# Patient Record
Sex: MALE | Race: WHITE | Employment: OTHER | URBAN - METROPOLITAN AREA
[De-identification: names, ages, dates, MRNs, and addresses within clinical notes are randomized per-mention and may not be internally consistent; named-entity substitution may affect disease eponyms.]

---

## 2021-03-25 VITALS
HEART RATE: 70 BPM | DIASTOLIC BLOOD PRESSURE: 85 MMHG | RESPIRATION RATE: 18 BRPM | TEMPERATURE: 99.1 F | OXYGEN SATURATION: 98 % | WEIGHT: 133 LBS | SYSTOLIC BLOOD PRESSURE: 111 MMHG

## 2021-03-25 RX ORDER — TRAMADOL HYDROCHLORIDE 50 MG/1
50 TABLET ORAL EVERY 4 HOURS PRN
COMMUNITY
End: 2021-03-26

## 2021-03-25 RX ORDER — CLOBETASOL PROPIONATE 0.5 MG/G
CREAM TOPICAL 2 TIMES DAILY PRN
COMMUNITY

## 2021-03-25 RX ORDER — METOPROLOL TARTRATE 50 MG
50 TABLET ORAL 2 TIMES DAILY
COMMUNITY

## 2021-03-25 RX ORDER — TRIAMCINOLONE ACETONIDE 1 MG/G
CREAM TOPICAL 2 TIMES DAILY
COMMUNITY

## 2021-03-25 RX ORDER — DILTIAZEM HYDROCHLORIDE 240 MG/1
240 CAPSULE, EXTENDED RELEASE ORAL DAILY
COMMUNITY

## 2021-03-25 RX ORDER — LANOLIN ALCOHOL/MO/W.PET/CERES
6 CREAM (GRAM) TOPICAL AT BEDTIME
COMMUNITY

## 2021-03-25 RX ORDER — BETAMETHASONE DIPROPIONATE 0.5 MG/G
CREAM TOPICAL 2 TIMES DAILY PRN
COMMUNITY

## 2021-03-25 RX ORDER — LOPERAMIDE HCL 2 MG
2 CAPSULE ORAL DAILY PRN
COMMUNITY

## 2021-03-25 RX ORDER — DONEPEZIL HYDROCHLORIDE 5 MG/1
5 TABLET, FILM COATED ORAL AT BEDTIME
COMMUNITY

## 2021-03-25 RX ORDER — ACETAMINOPHEN 500 MG
1000 TABLET ORAL EVERY 6 HOURS PRN
COMMUNITY

## 2021-03-25 RX ORDER — SERTRALINE HYDROCHLORIDE 25 MG/1
25 TABLET, FILM COATED ORAL DAILY
COMMUNITY

## 2021-03-25 RX ORDER — TAMSULOSIN HYDROCHLORIDE 0.4 MG/1
0.4 CAPSULE ORAL DAILY
COMMUNITY

## 2021-03-26 ENCOUNTER — NURSING HOME VISIT (OUTPATIENT)
Dept: GERIATRICS | Facility: CLINIC | Age: 86
End: 2021-03-26
Payer: COMMERCIAL

## 2021-03-26 ENCOUNTER — HOSPITAL LABORATORY (OUTPATIENT)
Dept: NURSING HOME | Facility: OTHER | Age: 86
End: 2021-03-26

## 2021-03-26 DIAGNOSIS — F02.818 LATE ONSET ALZHEIMER'S DISEASE WITH BEHAVIORAL DISTURBANCE (H): ICD-10-CM

## 2021-03-26 DIAGNOSIS — F41.9 ANXIETY: ICD-10-CM

## 2021-03-26 DIAGNOSIS — N40.1 BENIGN PROSTATIC HYPERPLASIA WITH LOWER URINARY TRACT SYMPTOMS, SYMPTOM DETAILS UNSPECIFIED: ICD-10-CM

## 2021-03-26 DIAGNOSIS — I48.11 LONGSTANDING PERSISTENT ATRIAL FIBRILLATION (H): ICD-10-CM

## 2021-03-26 DIAGNOSIS — D62 ANEMIA DUE TO BLOOD LOSS, ACUTE: ICD-10-CM

## 2021-03-26 DIAGNOSIS — Z47.89 AFTERCARE FOLLOWING SURGERY OF THE MUSCULOSKELETAL SYSTEM: Primary | ICD-10-CM

## 2021-03-26 DIAGNOSIS — G30.1 LATE ONSET ALZHEIMER'S DISEASE WITH BEHAVIORAL DISTURBANCE (H): ICD-10-CM

## 2021-03-26 PROCEDURE — 99309 SBSQ NF CARE MODERATE MDM 30: CPT | Performed by: NURSE PRACTITIONER

## 2021-03-26 RX ORDER — TRAMADOL HYDROCHLORIDE 50 MG/1
TABLET ORAL
Qty: 200 TABLET | Refills: 0 | Status: SHIPPED | OUTPATIENT
Start: 2021-03-26

## 2021-03-26 NOTE — PROGRESS NOTES
[place] [facility]  PRIMARY CARE PROVIDER AND CLINIC:  No primary care provider on file., No primary physician on file.  Chief Complaint   Patient presents with     Hospital F/U     [place] Medical Record Number:  [id number]  Place of Service where encounter took place:  [facility] ([facility]) [[id number]]    Ludwin Glasgow  is a 89 year old  (2/14/1927), admitted to the above facility from  [facility]. Hospital stay 3/18/21 through 3/24/21..  Admitted to this facility for  rehab, medical management and nursing care.    HPI:    HPI information obtained from: facility chart records, facility staff, patient report and Care Everywhere Epic chart review.   Brief Summary of Hospital Course: lost his balance and had a fall resulting in a hip fracutre. Underwent ORIF on 3/19. Had problems with A fib with RVR.   MEDICATION CHANGES: started lovenox for 25 days. Started tramadol.  Diltiazem increased from 120 mg daily to 240 mg daily  RECOMMENDED FOLLOW UP: Orthopedic follow-up with Dr. Hill in 2 weeks  Updates on Status Since Skilled nursing Admission: 3/26 nursing reports that they would like the tramadol scheduled.    CODE STATUS/ADVANCE DIRECTIVES DISCUSSION:   DNR / DNI  Patient's living condition: lives in an assisted living facility - [facility]    ALLERGIES: Patient has no known allergies.  PAST MEDICAL HISTORY:  has no past medical history on file.  PAST SURGICAL HISTORY:   has no past surgical history on file.  FAMILY HISTORY: family history is not on file.  SOCIAL HISTORY:       Post Discharge Medication Reconciliation Status: discharge medications reconciled, continue medications without change  Current Outpatient Medications   Medication Sig Dispense Refill     acetaminophen (TYLENOL) 500 MG tablet Take 1,000 mg by mouth every 6 hours as needed for mild pain       alum & mag hydroxide-simethicone (MAALOX) 200-200-25 MG CHEW chewable tablet Take  1 tablet by mouth daily as needed for indigestion       betamethasone dipropionate (DIPROSONE) 0.05 % external cream Apply topically 2 times daily as needed       cholecalciferol (VITAMIN D3) 25 mcg (1000 units) capsule Take 1 capsule by mouth daily       clobetasol (TEMOVATE) 0.05 % external cream Apply topically 2 times daily as needed       diltiazem ER (DILT-XR) 240 MG 24 hr ER beaded capsule Take 240 mg by mouth daily       donepezil (ARICEPT) 5 MG tablet Take 5 mg by mouth At Bedtime       enoxaparin ANTICOAGULANT (LOVENOX) 40 MG/0.4ML syringe Inject 40 mg Subcutaneous daily       loperamide (IMODIUM) 2 MG capsule Take 2 mg by mouth daily as needed for diarrhea       magnesium hydroxide (MILK OF MAGNESIA) 400 MG/5ML suspension Take 5 mLs by mouth daily as needed for constipation or heartburn       melatonin 3 MG tablet Take 6 mg by mouth At Bedtime       metoprolol tartrate (LOPRESSOR) 50 MG tablet Take 50 mg by mouth 2 times daily       Mouthwashes (BIOTENE DRY MOUTH MT) Take by mouth daily as needed Non-Pharmacological Interventions:  1=Offer fluids if not NPO 2=Provide Toothette swab 3=Ice chips if not NPO  as needed for comfort cares       omeprazole (PRILOSEC) 20 MG DR capsule Take 20 mg by mouth daily       sertraline (ZOLOFT) 25 MG tablet Take 25 mg by mouth daily       tamsulosin (FLOMAX) 0.4 MG capsule Take 0.4 mg by mouth daily       Tolnaftate (ABSORBINE JR EX) AS NEEDED FOR PAIN  Non-Pharmacological Interventions:  1=Warm blanket 2=Re-position 3=Ice pack 4=Warm pack       traMADol (ULTRAM) 50 MG tablet Take 1 tablet (50 mg) by mouth 2 times daily. May also take 1 tablet (50 mg) 2 times daily as needed for pain. 200 tablet 0     triamcinolone (KENALOG) 0.1 % external cream Apply topically 2 times daily         REVIEW OF SYSTEMS:   Limited secondary to cognitive impairment but today pt reports pain with therapy.     PHYSICAL EXAM:  /85   Pulse 70   Temp 99.1  F (37.3  C)   Resp 18   Wt 60.3  kg (133 lb)   SpO2 98%   GENERAL APPEARANCE:  Alert, in no distress  RESP: Nonlabored respirations  CV:  edema no lower leg edema  M/S:  Gait and station observed in wheelchair working with therapy,   SKIN:  Inspection of skin and subcutaneous tissue at baseline.  Few areas of bruising on hands.  Incision not observed  PSYCH:  insight and judgement, memory impaired, affect and mood normal    ASSESSMENT/PLAN:  Aftercare following surgery of the musculoskeletal system  S/P ORIF of left hip fracture after a fall.  Admitted to the nursing home for therapy.  Will need to follow-up with Ortho in 2 weeks.  On Lovenox for DVT prevention. Physical Therapy reports grimaces in pain does not know to ask for pain medications.   -Weightbearing as tolerated per Ortho  - traMADol (ULTRAM) 50 MG tablet; Take 1 tablet (50 mg) by mouth 2 times daily. May also take 1 tablet (50 mg) 2 times daily as needed for pain.  - Acetaminophen 1,000 mg by mouth three times daily and 1,000 mg by mouth once daily as needed.   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  Patient had delirium while at the hospital.  Resides in a locked memory care at baseline.  Continues on Aricept and sertraline    Benign prostatic hyperplasia with lower urinary tract symptoms, symptom details unspecified  Stable. Continue flomax.    Anemia due to blood loss, acute  Recheck PRN if symptomatic. Post op hgb 10.2.     Orders:  Acetaminophen 1,000 mg by mouth three times daily and 1,000 mg by mouth once daily as needed.    Tramadol 50 mg p.o. twice a day at 6 AM and 12 PM and tramadol 50 mg p.o. twice a day as needed for pain.    Electronically signed by:  Cherry DYE CNP

## 2021-03-26 NOTE — LETTER
3/26/2021        RE: Ludwin Glasgow  [address] [place] [address]        [place] [facility]  PRIMARY CARE PROVIDER AND CLINIC:  No primary care provider on file., No primary physician on file.  Chief Complaint   Patient presents with     Hospital F/U     [place] Medical Record Number:  [id number]  Place of Service where encounter took place:  [facility] [[id number]]    Ludwin Glasgow  is a 89 year old  (2/14/1927), admitted to the above facility from  [facility]. Hospital stay 3/18/21 through 3/24/21..  Admitted to this facility for  rehab, medical management and nursing care.    HPI:    HPI information obtained from: facility chart records, facility staff, patient report and Care Everywhere Epic chart review.   Brief Summary of Hospital Course: lost his balance and had a fall resulting in a hip fracutre. Underwent ORIF on 3/19. Had problems with A fib with RVR.   MEDICATION CHANGES: started lovenox for 25 days. Started tramadol.  Diltiazem increased from 120 mg daily to 240 mg daily  RECOMMENDED FOLLOW UP: Orthopedic follow-up with Dr. Hill in 2 weeks  Updates on Status Since Skilled nursing Admission: 3/26 nursing reports that they would like the tramadol scheduled.    CODE STATUS/ADVANCE DIRECTIVES DISCUSSION:   DNR / DNI  Patient's living condition: lives in an assisted living facility [facility]    ALLERGIES: Patient has no known allergies.  PAST MEDICAL HISTORY:  has no past medical history on file.  PAST SURGICAL HISTORY:   has no past surgical history on file.  FAMILY HISTORY: family history is not on file.  SOCIAL HISTORY:       Post Discharge Medication Reconciliation Status: discharge medications reconciled, continue medications without change  Current Outpatient Medications   Medication Sig Dispense Refill     acetaminophen (TYLENOL) 500 MG tablet Take 1,000 mg by mouth every 6 hours as needed for  mild pain       alum & mag hydroxide-simethicone (MAALOX) 200-200-25 MG CHEW chewable tablet Take 1 tablet by mouth daily as needed for indigestion       betamethasone dipropionate (DIPROSONE) 0.05 % external cream Apply topically 2 times daily as needed       cholecalciferol (VITAMIN D3) 25 mcg (1000 units) capsule Take 1 capsule by mouth daily       clobetasol (TEMOVATE) 0.05 % external cream Apply topically 2 times daily as needed       diltiazem ER (DILT-XR) 240 MG 24 hr ER beaded capsule Take 240 mg by mouth daily       donepezil (ARICEPT) 5 MG tablet Take 5 mg by mouth At Bedtime       enoxaparin ANTICOAGULANT (LOVENOX) 40 MG/0.4ML syringe Inject 40 mg Subcutaneous daily       loperamide (IMODIUM) 2 MG capsule Take 2 mg by mouth daily as needed for diarrhea       magnesium hydroxide (MILK OF MAGNESIA) 400 MG/5ML suspension Take 5 mLs by mouth daily as needed for constipation or heartburn       melatonin 3 MG tablet Take 6 mg by mouth At Bedtime       metoprolol tartrate (LOPRESSOR) 50 MG tablet Take 50 mg by mouth 2 times daily       Mouthwashes (BIOTENE DRY MOUTH MT) Take by mouth daily as needed Non-Pharmacological Interventions:  1=Offer fluids if not NPO 2=Provide Toothette swab 3=Ice chips if not NPO  as needed for comfort cares       omeprazole (PRILOSEC) 20 MG DR capsule Take 20 mg by mouth daily       sertraline (ZOLOFT) 25 MG tablet Take 25 mg by mouth daily       tamsulosin (FLOMAX) 0.4 MG capsule Take 0.4 mg by mouth daily       Tolnaftate (ABSORBINE JR EX) AS NEEDED FOR PAIN  Non-Pharmacological Interventions:  1=Warm blanket 2=Re-position 3=Ice pack 4=Warm pack       traMADol (ULTRAM) 50 MG tablet Take 1 tablet (50 mg) by mouth 2 times daily. May also take 1 tablet (50 mg) 2 times daily as needed for pain. 200 tablet 0     triamcinolone (KENALOG) 0.1 % external cream Apply topically 2 times daily         REVIEW OF SYSTEMS:   Limited secondary to cognitive impairment but today pt reports pain with  therapy.     PHYSICAL EXAM:  /85   Pulse 70   Temp 99.1  F (37.3  C)   Resp 18   Wt 60.3 kg (133 lb)   SpO2 98%   GENERAL APPEARANCE:  Alert, in no distress  RESP: Nonlabored respirations  CV:  edema no lower leg edema  M/S:  Gait and station observed in wheelchair working with therapy,   SKIN:  Inspection of skin and subcutaneous tissue at baseline.  Few areas of bruising on hands.  Incision not observed  PSYCH:  insight and judgement, memory impaired, affect and mood normal    ASSESSMENT/PLAN:  Aftercare following surgery of the musculoskeletal system  S/P ORIF of left hip fracture after a fall.  Admitted to the nursing home for therapy.  Will need to follow-up with Ortho in 2 weeks.  On Lovenox for DVT prevention. Physical Therapy reports grimaces in pain does not know to ask for pain medications.   -Weightbearing as tolerated per Ortho  - traMADol (ULTRAM) 50 MG tablet; Take 1 tablet (50 mg) by mouth 2 times daily. May also take 1 tablet (50 mg) 2 times daily as needed for pain.  - Acetaminophen 1,000 mg by mouth three times daily and 1,000 mg by mouth once daily as needed.   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  Patient had delirium while at the hospital.  Resides in a locked [facility] at baseline.  Continues on Aricept and sertraline    Benign prostatic hyperplasia with lower urinary tract symptoms, symptom details unspecified  Stable. Continue flomax.    Anemia due to blood loss, acute  Recheck PRN if symptomatic. Post op hgb 10.2.     Orders:  Acetaminophen 1,000 mg by mouth three times daily and 1,000 mg by mouth once daily as needed.    Tramadol 50 mg p.o. twice a day at 6 AM and 12 PM and tramadol 50 mg p.o. twice a day as needed for pain.    Electronically signed by:  Cherry  Cuate APRN CNP          Sincerely,        Cherry Cuello, NP

## 2021-03-28 LAB
GAMMA INTERFERON BACKGROUND BLD IA-ACNC: 0.05 IU/ML
M TB IFN-G CD4+ BCKGRND COR BLD-ACNC: 0.86 IU/ML
M TB TUBERC IFN-G BLD QL: NEGATIVE
MITOGEN IGNF BCKGRD COR BLD-ACNC: 0.01 IU/ML
MITOGEN IGNF BCKGRD COR BLD-ACNC: 0.01 IU/ML

## 2021-03-30 ENCOUNTER — NURSING HOME VISIT (OUTPATIENT)
Dept: GERIATRICS | Facility: CLINIC | Age: 86
End: 2021-03-30
Payer: COMMERCIAL

## 2021-03-30 VITALS
DIASTOLIC BLOOD PRESSURE: 69 MMHG | WEIGHT: 133 LBS | HEIGHT: 66 IN | RESPIRATION RATE: 18 BRPM | BODY MASS INDEX: 21.38 KG/M2 | OXYGEN SATURATION: 97 % | TEMPERATURE: 99.1 F | SYSTOLIC BLOOD PRESSURE: 124 MMHG | HEART RATE: 82 BPM

## 2021-03-30 DIAGNOSIS — G30.1 LATE ONSET ALZHEIMER'S DISEASE WITH BEHAVIORAL DISTURBANCE (H): ICD-10-CM

## 2021-03-30 DIAGNOSIS — N40.1 BENIGN PROSTATIC HYPERPLASIA WITH LOWER URINARY TRACT SYMPTOMS, SYMPTOM DETAILS UNSPECIFIED: ICD-10-CM

## 2021-03-30 DIAGNOSIS — I48.11 LONGSTANDING PERSISTENT ATRIAL FIBRILLATION (H): ICD-10-CM

## 2021-03-30 DIAGNOSIS — F41.9 ANXIETY: ICD-10-CM

## 2021-03-30 DIAGNOSIS — F02.818 LATE ONSET ALZHEIMER'S DISEASE WITH BEHAVIORAL DISTURBANCE (H): ICD-10-CM

## 2021-03-30 DIAGNOSIS — Z47.89 AFTERCARE FOLLOWING SURGERY OF THE MUSCULOSKELETAL SYSTEM: Primary | ICD-10-CM

## 2021-03-30 DIAGNOSIS — D62 ANEMIA DUE TO BLOOD LOSS, ACUTE: ICD-10-CM

## 2021-03-30 PROCEDURE — 99309 SBSQ NF CARE MODERATE MDM 30: CPT | Performed by: NURSE PRACTITIONER

## 2021-03-30 ASSESSMENT — MIFFLIN-ST. JEOR: SCORE: 1186.03

## 2021-03-30 NOTE — PROGRESS NOTES
[place] [facility]  PRIMARY CARE PROVIDER AND CLINIC:  No primary care provider on file., No primary physician on file.  Chief Complaint   Patient presents with     RECHECK     [place] Medical Record Number:  [id number]  Place of Service where encounter took place:  [facility] ([facility]) [[id number]]    Ludwin Glasgow  is a 89 year old  (2/14/1927), admitted to the above facility from  [facility]. Hospital stay 3/18/21 through 3/24/21..  Admitted to this facility for  rehab, medical management and nursing care.    HPI:    HPI information obtained from: facility chart records, facility staff, patient report and Care Everywhere Epic chart review.   Brief Summary of Hospital Course: lost his balance and had a fall resulting in a hip fracutre. Underwent ORIF on 3/19. Had problems with A fib with RVR.   MEDICATION CHANGES: started lovenox for 25 days. Started tramadol.  Diltiazem increased from 120 mg daily to 240 mg daily  RECOMMENDED FOLLOW UP: Orthopedic follow-up with Dr. Hill in 2 weeks  Updates on Status Since Skilled nursing Admission: 3/26 nursing reports that they would like the tramadol scheduled. Orders: Acetaminophen 1,000 mg by mouth three times daily and 1,000 mg by mouth once daily as needed.    Tramadol 50 mg p.o. twice a day at 6 AM and 12 PM and tramadol 50 mg p.o. twice a day as needed for pain.  3/30 nursing reports no concerns, therapy reports refusing therapy at times. Does not always want to walk with them.    CODE STATUS/ADVANCE DIRECTIVES DISCUSSION:   DNR / DNI  Patient's living condition: lives in an assisted living facility [facility]    ALLERGIES: Patient has no known allergies.  Past Medical, Surgical, Family and Social History reviewed and updated in EPIC.  Medication list and progress notes reviewed in nursing home electronic health record    REVIEW OF SYSTEMS:   Limited secondary to cognitive impairment but today pt  "reports no pain    PHYSICAL EXAM:  /69   Pulse 82   Temp 99.1  F (37.3  C)   Resp 18   Ht 1.676 m (5' 6\")   Wt 60.3 kg (133 lb)   SpO2 97%   BMI 21.47 kg/m    GENERAL APPEARANCE:  Alert, in no distress  RESP: Nonlabored respirations  CV:  Edema - no lower leg edema. Has bora stocking on.   M/S:  Gait and station observed in wheelchair.  SKIN:  Inspection of skin and subcutaneous tissue at baseline.  Few areas of bruising on hands.  Incision not observed  PSYCH:  insight and judgement, memory impaired, affect and mood normal    ASSESSMENT/PLAN:  Aftercare following surgery of the musculoskeletal system  S/P ORIF of left hip fracture after a fall.  Admitted to the nursing home for therapy.  Will need to follow-up with Ortho in 2 weeks.  On Lovenox for DVT prevention.   -Weightbearing as tolerated per Ortho  - traMADol (ULTRAM) 50 MG tablet; Take 1 tablet (50 mg) by mouth 2 times daily. May also take 1 tablet (50 mg) 2 times daily as needed for pain.  - Acetaminophen 1,000 mg by mouth three times daily and 1,000 mg by mouth once daily as needed.   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  Patient had delirium while at the hospital.  Resides in a locked [facility] at baseline.  Continues on Aricept and sertraline.    Benign prostatic hyperplasia with lower urinary tract symptoms, symptom details unspecified  Stable. Continue flomax.    Anemia due to blood loss, acute  Recheck PRN if symptomatic. Post op hgb 10.2.     Orders:  No changes.     Electronically signed by:  Cherry DYE CNP  "

## 2021-03-30 NOTE — LETTER
3/30/2021        RE: Ludwin Glasgow  [address] [place] [address]        [place] [facility]  PRIMARY CARE PROVIDER AND CLINIC:  No primary care provider on file., No primary physician on file.  Chief Complaint   Patient presents with     RECHECK     [place] Medical Record Number:  [id number]  Place of Service where encounter took place:  [facility] ([facility]) [[id number]]    Ludwin Glasgow  is a 89 year old  (2/14/1927), admitted to the above facility from  [facility]. Hospital stay 3/18/21 through 3/24/21..  Admitted to this facility for  rehab, medical management and nursing care.    HPI:    HPI information obtained from: facility chart records, facility staff, patient report and Care Everywhere [facility] chart review.   Brief Summary of Hospital Course: lost his balance and had a fall resulting in a hip fracutre. Underwent ORIF on 3/19. Had problems with A fib with RVR.   MEDICATION CHANGES: started lovenox for 25 days. Started tramadol.  Diltiazem increased from 120 mg daily to 240 mg daily  RECOMMENDED FOLLOW UP: Orthopedic follow-up with Dr. Hill in 2 weeks  Updates on Status Since Skilled nursing Admission: 3/26 nursing reports that they would like the tramadol scheduled. Orders: Acetaminophen 1,000 mg by mouth three times daily and 1,000 mg by mouth once daily as needed.    Tramadol 50 mg p.o. twice a day at 6 AM and 12 PM and tramadol 50 mg p.o. twice a day as needed for pain.  3/30 nursing reports no concerns, therapy reports refusing therapy at times. Does not always want to walk with them.    CODE STATUS/ADVANCE DIRECTIVES DISCUSSION:   DNR / DNI  Patient's living condition: lives in an assisted living facility [facility]    ALLERGIES: Patient has no known allergies.  Past Medical, Surgical, Family and Social History reviewed and updated in EPIC.  Medication list and progress notes reviewed in nursing home electronic  "health record    REVIEW OF SYSTEMS:   Limited secondary to cognitive impairment but today pt reports no pain    PHYSICAL EXAM:  /69   Pulse 82   Temp 99.1  F (37.3  C)   Resp 18   Ht 1.676 m (5' 6\")   Wt 60.3 kg (133 lb)   SpO2 97%   BMI 21.47 kg/m    GENERAL APPEARANCE:  Alert, in no distress  RESP: Nonlabored respirations  CV:  Edema - no lower leg edema. Has bora stocking on.   M/S:  Gait and station observed in wheelchair.  SKIN:  Inspection of skin and subcutaneous tissue at baseline.  Few areas of bruising on hands.  Incision not observed  PSYCH:  insight and judgement, memory impaired, affect and mood normal    ASSESSMENT/PLAN:  Aftercare following surgery of the musculoskeletal system  S/P ORIF of left hip fracture after a fall.  Admitted to the nursing home for therapy.  Will need to follow-up with Ortho in 2 weeks.  On Lovenox for DVT prevention.   -Weightbearing as tolerated per Ortho  - traMADol (ULTRAM) 50 MG tablet; Take 1 tablet (50 mg) by mouth 2 times daily. May also take 1 tablet (50 mg) 2 times daily as needed for pain.  - Acetaminophen 1,000 mg by mouth three times daily and 1,000 mg by mouth once daily as needed.   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  Patient had delirium while at the hospital.  Resides in a locked [facility] at baseline.  Continues on Aricept and sertraline.    Benign prostatic hyperplasia with lower urinary tract symptoms, symptom details unspecified  Stable. Continue flomax.    Anemia due to blood loss, acute  Recheck PRN if symptomatic. Post op hgb 10.2.     Orders:  No changes.     Electronically signed by:  Cherry DYE CNP        Sincerely,        Cherry Cuello NP    "

## 2021-04-07 VITALS
RESPIRATION RATE: 16 BRPM | OXYGEN SATURATION: 96 % | SYSTOLIC BLOOD PRESSURE: 167 MMHG | HEIGHT: 66 IN | TEMPERATURE: 99.1 F | DIASTOLIC BLOOD PRESSURE: 85 MMHG | BODY MASS INDEX: 20.54 KG/M2 | HEART RATE: 64 BPM | WEIGHT: 127.8 LBS

## 2021-04-07 ASSESSMENT — MIFFLIN-ST. JEOR: SCORE: 1162.45

## 2021-04-07 NOTE — PROGRESS NOTES
[place] [facility]  PRIMARY CARE PROVIDER AND CLINIC:  Maia Jon NP, [facility] [address]  / [place] [address]  Chief Complaint   Patient presents with     Hospital F/U     [place] Medical Record Number:  [id number]  Place of Service where encounter took place:  [facility] AND REHAB [facility] ([facility]) [[id number]]    Ludwin Glasgow  is a 89 year old  (2/14/1927), admitted to the above facility from  [facility]. Hospital stay 3/18/21 through 3/24/21..  Admitted to this facility for  rehab, medical management and nursing care.    HPI:    HPI information obtained from: facility chart records, facility staff, patient report and [facility] chart review.   Brief Summary of Hospital Course:   -Patient with PMH pertinent for A. Fib, AD, had a fall which resulted in right  intertrochanteric fracture status post ORIF on March 19th. Started on Lovenox for DVT prophylaxis  - Hospitalization was complicated with:  *  A. fib RVR, Cardizem was increased from 120 mg to 240 mg ,and started on metoprolol tartrate 50 mg bid.   * acute blood loss anemia : did not require blood transfusion  *and hypomagnesemia replaced:   *Surgical delirium    Today:  - Rt IT fx: pt reports has no pain.   - Rehab: pt report getting therapy, could not elaborate further.   ===========================================    CODE STATUS/ADVANCE DIRECTIVES DISCUSSION:   DNR / DNI  Patient's living condition: lives in an assisted living facility  ALLERGIES: Patient has no known allergies.  PAST MEDICAL HISTORY:   Acquired deformity of calcaneus       Actinic keratosis       Adverse reaction to drug       Atrial fibrillation, permanent (HCC)       Benign prostatic hyperplasia       Constipation       Detrusor instability       Eczema       Esophageal reflux       Glossodynia       Anemia   HISTORY   Cholelithiasis   HISTORY   Dry mouth       Gastric ulcer   HISTORY   Increased urinary frequency       Neoplasm of  uncertain behavior of skin       Nocturia       Obstructive sleep apnea       Overactive bladder       Prostatic hypertrophy       Seborrheic keratosis       Tubular adenoma of colon       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       Vitamin D deficiency       Weight loss         PAST SURGICAL HISTORY:    APPENDECTOMY;          AFTER CATARACT LASER SURGERY          CIRCUMCISION          COLONOSCOPY     FIBEROPTIC     SEPTOPLASTY          FAMILY HISTORY:   Medical History Relation Name Comments   Cancer (other) Brother   SKIN   Breast Cancer Daughter       Cancer (other) Mother   GASTRIC   Cancer (other) Sister   GASTRIC       SOCIAL HISTORY:      Never Smoker   0       Smokeless Tobacco: Never Used          Alcohol Use Drinks/Week oz/Week Comments   Yes     rarely           Post Discharge Medication Reconciliation Status: discharge medications reconciled and changed, per note/orders  Current Outpatient Medications   Medication Sig Dispense Refill     acetaminophen (TYLENOL) 500 MG tablet Take 1,000 mg by mouth every 6 hours as needed for mild pain       alum & mag hydroxide-simethicone (MAALOX) 200-200-25 MG CHEW chewable tablet Take 1 tablet by mouth daily as needed for indigestion       betamethasone dipropionate (DIPROSONE) 0.05 % external cream Apply topically 2 times daily as needed       cholecalciferol (VITAMIN D3) 25 mcg (1000 units) capsule Take 1 capsule by mouth daily       clobetasol (TEMOVATE) 0.05 % external cream Apply topically 2 times daily as needed       diltiazem ER (DILT-XR) 240 MG 24 hr ER beaded capsule Take 240 mg by mouth daily       donepezil (ARICEPT) 5 MG tablet Take 5 mg by mouth At Bedtime       enoxaparin ANTICOAGULANT (LOVENOX) 40 MG/0.4ML syringe Inject 40 mg Subcutaneous daily       loperamide (IMODIUM) 2 MG capsule Take 2 mg by mouth daily as needed for diarrhea       magnesium hydroxide (MILK OF MAGNESIA) 400 MG/5ML suspension Take 5 mLs by mouth daily as needed for  "constipation or heartburn       melatonin 3 MG tablet Take 6 mg by mouth At Bedtime       metoprolol tartrate (LOPRESSOR) 50 MG tablet Take 50 mg by mouth 2 times daily       Mouthwashes (BIOTENE DRY MOUTH MT) Take by mouth daily as needed Non-Pharmacological Interventions:  1=Offer fluids if not NPO 2=Provide Toothette swab 3=Ice chips if not NPO  as needed for comfort cares       omeprazole (PRILOSEC) 20 MG DR capsule Take 20 mg by mouth daily       sertraline (ZOLOFT) 25 MG tablet Take 25 mg by mouth daily       tamsulosin (FLOMAX) 0.4 MG capsule Take 0.4 mg by mouth daily       Tolnaftate (ABSORBINE JR EX) AS NEEDED FOR PAIN  Non-Pharmacological Interventions:  1=Warm blanket 2=Re-position 3=Ice pack 4=Warm pack       traMADol (ULTRAM) 50 MG tablet Take 1 tablet (50 mg) by mouth 2 times daily. May also take 1 tablet (50 mg) 2 times daily as needed for pain. 200 tablet 0     triamcinolone (KENALOG) 0.1 % external cream Apply topically 2 times daily         ROS: Unobtainable secondary to severe hearing deficit.     Vitals:  BP (!) 167/85   Pulse 64   Temp 99.1  F (37.3  C)   Resp 16   Ht 1.676 m (5' 6\")   Wt 58 kg (127 lb 12.8 oz)   SpO2 96%   BMI 20.63 kg/m    Exam:  GENERAL APPEARANCE:  in no distress, cooperative  ENT: extremely hard of hearing, hearing aids in place..  Mouth and posterior oropharynx normal, moist mucous membranes, oral mucosa moist, no lesion noted.   EYES:  EOMI, Pupil rounded and equal.  RESP:  lungs clear to auscultation   CV:  S1S2 audible, regular HR, no murmur appreciated.   ABDOMEN:  soft, NT/ND, BS audible. no mass appreciated on palpation.   M/S:   no joint deformity noted on observation.   SKIN:  Surgical site over right hip area w/o stitches, slight fading ecchymosis, but no erythema of drainage.   NEURO:   No NFD appreciated on observation. Hand  5/5 b/l  PSYCH:  affect and mood normal        Lab/Diagnostic data: Reviewed in the chart and EHR.  "     ASSESSMENT/PLAN:  ------------------------------  Mechanical fall resulted in communited right intertrochanteric fracture status post ORIF  Acute pain  Aftercare following surgery of the musculoskeletal system  Recurrent fall and recent left scapular fx (Dec 2020)  Generalized muscle weakness  - - Started rehab program, making a progress, continue until desired goal is achieved.   - Analgesia optimal  - Followed by Orthopedic Team.       Longstanding persistent atrial fibrillation (H)  -Recent AVR while hospitalized.  Cardizem increased from 120 mg to 240 mg, and was started on metoprolol milligram twice daily.  Monitor heart rate closely and adjust medication as needed.   -Currently on Lovenox 40 mg for DVT prophylaxis.  Patient was not on any anticoagulation and currently does not seem to be on anticoagulation for A. fib RVR.  I could not find the hospital note and discussion about anticoagulation.  Patient is with frequent falls and injuries.  Given age and risk of bleeding from falls we do not recommend OAC..      Acute blood loss anemia: did not required blood transfusion. Trend HH, and monitor clinically.     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 Had delirium while hospitalized.   - Continue to anticipate needs. Chronic condition, ongoing decline expected.   -  Continue to provide redirection and reassurance as needed. Maintain safe living situation with goals focused on comfort.  - on donepezil, and zoloft 25 mg.       Recent Hypomagnesemia: replaced while hospitalized.   Benign prostatic hyperplasia with lower urinary tract symptoms, symptom details unspecified: on flomax. No concern      Order: See above, otherwise, continue the rest of the current POC.       Electronically signed by:  Marito Mckeon MD

## 2021-04-08 ENCOUNTER — NURSING HOME VISIT (OUTPATIENT)
Dept: GERIATRICS | Facility: CLINIC | Age: 86
End: 2021-04-08
Payer: COMMERCIAL

## 2021-04-08 DIAGNOSIS — I48.11 LONGSTANDING PERSISTENT ATRIAL FIBRILLATION (H): ICD-10-CM

## 2021-04-08 DIAGNOSIS — R29.6 RECURRENT FALLS: Primary | ICD-10-CM

## 2021-04-08 DIAGNOSIS — G30.1 LATE ONSET ALZHEIMER'S DISEASE WITH BEHAVIORAL DISTURBANCE (H): ICD-10-CM

## 2021-04-08 DIAGNOSIS — F02.818 LATE ONSET ALZHEIMER'S DISEASE WITH BEHAVIORAL DISTURBANCE (H): ICD-10-CM

## 2021-04-08 DIAGNOSIS — N40.1 BENIGN PROSTATIC HYPERPLASIA WITH LOWER URINARY TRACT SYMPTOMS, SYMPTOM DETAILS UNSPECIFIED: ICD-10-CM

## 2021-04-08 DIAGNOSIS — Z47.89 AFTERCARE FOLLOWING SURGERY OF THE MUSCULOSKELETAL SYSTEM: ICD-10-CM

## 2021-04-08 DIAGNOSIS — R52 ACUTE PAIN: ICD-10-CM

## 2021-04-08 DIAGNOSIS — D62 ANEMIA DUE TO BLOOD LOSS, ACUTE: ICD-10-CM

## 2021-04-08 PROCEDURE — 99305 1ST NF CARE MODERATE MDM 35: CPT | Mod: AI | Performed by: FAMILY MEDICINE

## 2021-04-08 NOTE — LETTER
4/8/2021        RE: Ludwin Glasgow  [address] [place] [address]        [place] [facility]  PRIMARY CARE PROVIDER AND CLINIC:  Maia Jon NP, [place] [id number] [facility] / [place] [address]  Chief Complaint   Patient presents with     Hospital F/U     [place] Medical Record Number:  [id number]  Place of Service where encounter took place:  [facility] ([facility]) [[id number]]    Ludwin Glasgow  is a 89 year old  (2/14/1927), admitted to the above facility from  [facility]. Hospital stay 3/18/21 through 3/24/21..  Admitted to this facility for  rehab, medical management and nursing care.    HPI:    HPI information obtained from: facility chart records, facility staff, patient report and [facility] chart review.   Brief Summary of Hospital Course:   -Patient with PMH pertinent for A. Fib, AD, had a fall which resulted in right  intertrochanteric fracture status post ORIF on March 19th. Started on Lovenox for DVT prophylaxis  - Hospitalization was complicated with:  *  A. fib RVR, Cardizem was increased from 120 mg to 240 mg ,and started on metoprolol tartrate 50 mg bid.   * acute blood loss anemia : did not require blood transfusion  *and hypomagnesemia replaced:   *Surgical delirium    Today:  - Rt IT fx: pt reports has no pain.   - Rehab: pt report getting therapy, could not elaborate further.   ===========================================    CODE STATUS/ADVANCE DIRECTIVES DISCUSSION:   DNR / DNI  Patient's living condition: lives in an assisted living facility  ALLERGIES: Patient has no known allergies.  PAST MEDICAL HISTORY:   Acquired deformity of calcaneus       Actinic keratosis       Adverse reaction to drug       Atrial fibrillation, permanent (HCC)       Benign prostatic hyperplasia       Constipation       Detrusor instability       Eczema       Esophageal reflux       Glossodynia       Anemia   HISTORY   Cholelithiasis    HISTORY   Dry mouth       Gastric ulcer   HISTORY   Increased urinary frequency       Neoplasm of uncertain behavior of skin       Nocturia       Obstructive sleep apnea       Overactive bladder       Prostatic hypertrophy       Seborrheic keratosis       Tubular adenoma of colon       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       Vitamin D deficiency       Weight loss         PAST SURGICAL HISTORY:    APPENDECTOMY;          AFTER CATARACT LASER SURGERY          CIRCUMCISION          COLONOSCOPY     FIBEROPTIC     SEPTOPLASTY          FAMILY HISTORY:   Medical History Relation Name Comments   Cancer (other) Brother   SKIN   Breast Cancer Daughter       Cancer (other) Mother   GASTRIC   Cancer (other) Sister   GASTRIC       SOCIAL HISTORY:      Never Smoker   0       Smokeless Tobacco: Never Used          Alcohol Use Drinks/Week oz/Week Comments   Yes     rarely           Post Discharge Medication Reconciliation Status: discharge medications reconciled and changed, per note/orders  Current Outpatient Medications   Medication Sig Dispense Refill     acetaminophen (TYLENOL) 500 MG tablet Take 1,000 mg by mouth every 6 hours as needed for mild pain       alum & mag hydroxide-simethicone (MAALOX) 200-200-25 MG CHEW chewable tablet Take 1 tablet by mouth daily as needed for indigestion       betamethasone dipropionate (DIPROSONE) 0.05 % external cream Apply topically 2 times daily as needed       cholecalciferol (VITAMIN D3) 25 mcg (1000 units) capsule Take 1 capsule by mouth daily       clobetasol (TEMOVATE) 0.05 % external cream Apply topically 2 times daily as needed       diltiazem ER (DILT-XR) 240 MG 24 hr ER beaded capsule Take 240 mg by mouth daily       donepezil (ARICEPT) 5 MG tablet Take 5 mg by mouth At Bedtime       enoxaparin ANTICOAGULANT (LOVENOX) 40 MG/0.4ML syringe Inject 40 mg Subcutaneous daily       loperamide (IMODIUM) 2 MG capsule Take 2 mg by mouth daily as needed for diarrhea        "magnesium hydroxide (MILK OF MAGNESIA) 400 MG/5ML suspension Take 5 mLs by mouth daily as needed for constipation or heartburn       melatonin 3 MG tablet Take 6 mg by mouth At Bedtime       metoprolol tartrate (LOPRESSOR) 50 MG tablet Take 50 mg by mouth 2 times daily       Mouthwashes (BIOTENE DRY MOUTH MT) Take by mouth daily as needed Non-Pharmacological Interventions:  1=Offer fluids if not NPO 2=Provide Toothette swab 3=Ice chips if not NPO  as needed for comfort cares       omeprazole (PRILOSEC) 20 MG DR capsule Take 20 mg by mouth daily       sertraline (ZOLOFT) 25 MG tablet Take 25 mg by mouth daily       tamsulosin (FLOMAX) 0.4 MG capsule Take 0.4 mg by mouth daily       Tolnaftate (ABSORBINE JR EX) AS NEEDED FOR PAIN  Non-Pharmacological Interventions:  1=Warm blanket 2=Re-position 3=Ice pack 4=Warm pack       traMADol (ULTRAM) 50 MG tablet Take 1 tablet (50 mg) by mouth 2 times daily. May also take 1 tablet (50 mg) 2 times daily as needed for pain. 200 tablet 0     triamcinolone (KENALOG) 0.1 % external cream Apply topically 2 times daily         ROS: Unobtainable secondary to severe hearing deficit.     Vitals:  BP (!) 167/85   Pulse 64   Temp 99.1  F (37.3  C)   Resp 16   Ht 1.676 m (5' 6\")   Wt 58 kg (127 lb 12.8 oz)   SpO2 96%   BMI 20.63 kg/m    Exam:  GENERAL APPEARANCE:  in no distress, cooperative  ENT: extremely hard of hearing, hearing aids in place..  Mouth and posterior oropharynx normal, moist mucous membranes, oral mucosa moist, no lesion noted.   EYES:  EOMI, Pupil rounded and equal.  RESP:  lungs clear to auscultation   CV:  S1S2 audible, regular HR, no murmur appreciated.   ABDOMEN:  soft, NT/ND, BS audible. no mass appreciated on palpation.   M/S:   no joint deformity noted on observation.   SKIN:  Surgical site over right hip area w/o stitches, slight fading ecchymosis, but no erythema of drainage.   NEURO:   No NFD appreciated on observation. Hand  5/5 b/l  PSYCH:  affect " and mood normal        Lab/Diagnostic data: Reviewed in the chart and EHR.      ASSESSMENT/PLAN:  ------------------------------  Mechanical fall resulted in communited right intertrochanteric fracture status post ORIF  Acute pain  Aftercare following surgery of the musculoskeletal system  Recurrent fall and recent left scapular fx (Dec 2020)  Generalized muscle weakness  - - Started rehab program, making a progress, continue until desired goal is achieved.   - Analgesia optimal  - Followed by Orthopedic Team.       Longstanding persistent atrial fibrillation (H)  -Recent AVR while hospitalized.  Cardizem increased from 120 mg to 240 mg, and was started on metoprolol milligram twice daily.  Monitor heart rate closely and adjust medication as needed.   -Currently on Lovenox 40 mg for DVT prophylaxis.  Patient was not on any anticoagulation and currently does not seem to be on anticoagulation for A. fib RVR.  I could not find the hospital note and discussion about anticoagulation.  Patient is with frequent falls and injuries.  Given age and risk of bleeding from falls we do not recommend OAC..      Acute blood loss anemia: did not required blood transfusion. Trend HH, and monitor clinically.     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 Had delirium while hospitalized.   - Continue to anticipate needs. Chronic condition, ongoing decline expected.   -  Continue to provide redirection and reassurance as needed. Maintain safe living situation with goals focused on comfort.  - on donepezil, and zoloft 25 mg.       Recent Hypomagnesemia: replaced while hospitalized.   Benign prostatic hyperplasia with lower urinary tract symptoms, symptom details unspecified: on flomax. No concern      Order: See above, otherwise, continue the rest of the current POC.       Electronically signed by:  Marito Mckeon  MD                        Sincerely,        Marito Mckeon MD

## 2021-04-10 ENCOUNTER — TELEPHONE (OUTPATIENT)
Dept: GERIATRICS | Facility: CLINIC | Age: 86
End: 2021-04-10

## 2021-04-10 NOTE — TELEPHONE ENCOUNTER
Having R ear pain with swelling below ear on cheek, hearing intact, canal shiny and irritated, afebrile, GFR >60, no distress.  -augmentin 875/125 BID x5 days for potential otitis media  -hold R hearing aid x5day to prevent irritation

## 2021-04-13 ENCOUNTER — NURSING HOME VISIT (OUTPATIENT)
Dept: GERIATRICS | Facility: CLINIC | Age: 86
End: 2021-04-13
Payer: COMMERCIAL

## 2021-04-13 VITALS
RESPIRATION RATE: 20 BRPM | TEMPERATURE: 97.9 F | DIASTOLIC BLOOD PRESSURE: 67 MMHG | HEIGHT: 66 IN | OXYGEN SATURATION: 97 % | SYSTOLIC BLOOD PRESSURE: 98 MMHG | HEART RATE: 96 BPM | BODY MASS INDEX: 20.54 KG/M2 | WEIGHT: 127.8 LBS

## 2021-04-13 DIAGNOSIS — N40.1 BENIGN PROSTATIC HYPERPLASIA WITH LOWER URINARY TRACT SYMPTOMS, SYMPTOM DETAILS UNSPECIFIED: ICD-10-CM

## 2021-04-13 DIAGNOSIS — I48.11 LONGSTANDING PERSISTENT ATRIAL FIBRILLATION (H): ICD-10-CM

## 2021-04-13 DIAGNOSIS — K08.9 POOR DENTITION: ICD-10-CM

## 2021-04-13 DIAGNOSIS — D62 ANEMIA DUE TO BLOOD LOSS, ACUTE: ICD-10-CM

## 2021-04-13 DIAGNOSIS — Z47.89 AFTERCARE FOLLOWING SURGERY OF THE MUSCULOSKELETAL SYSTEM: Primary | ICD-10-CM

## 2021-04-13 DIAGNOSIS — F02.818 LATE ONSET ALZHEIMER'S DISEASE WITH BEHAVIORAL DISTURBANCE (H): ICD-10-CM

## 2021-04-13 DIAGNOSIS — G30.1 LATE ONSET ALZHEIMER'S DISEASE WITH BEHAVIORAL DISTURBANCE (H): ICD-10-CM

## 2021-04-13 DIAGNOSIS — R68.84 JAW PAIN: ICD-10-CM

## 2021-04-13 PROCEDURE — 99309 SBSQ NF CARE MODERATE MDM 30: CPT | Performed by: NURSE PRACTITIONER

## 2021-04-13 ASSESSMENT — MIFFLIN-ST. JEOR: SCORE: 1162.45

## 2021-04-13 NOTE — LETTER
4/13/2021        RE: Ludwin Glasgow  [address] [place] [address]        [place] [facility]  PRIMARY CARE PROVIDER AND CLINIC:  No primary care provider on file., No primary physician on file.  Chief Complaint   Patient presents with     senior care Acute     [place] Medical Record Number:  [id number]  Place of Service where encounter took place:  [facility] ([facility]) [[id number]]    Ludwin Glasgow  is a 89 year old  (2/14/1927), admitted to the above facility from  [facility]. Hospital stay 3/18/21 through 3/24/21..  Admitted to this facility for  rehab, medical management and nursing care.    HPI:    HPI information obtained from: facility chart records, facility staff, patient report and Care Everywhere Epic chart review.   Brief Summary of Hospital Course: lost his balance and had a fall resulting in a hip fracutre. Underwent ORIF on 3/19. Had problems with A fib with RVR.   MEDICATION CHANGES: started lovenox for 25 days. Started tramadol.  Diltiazem increased from 120 mg daily to 240 mg daily  RECOMMENDED FOLLOW UP: Orthopedic follow-up with Dr. Hill in 2 weeks  Updates on Status Since Skilled nursing Admission: 3/26 nursing reports that they would like the tramadol scheduled. Orders: Acetaminophen 1,000 mg by mouth three times daily and 1,000 mg by mouth once daily as needed.    Tramadol 50 mg p.o. twice a day at 6 AM and 12 PM and tramadol 50 mg p.o. twice a day as needed for pain.  3/30 nursing reports no concerns, therapy reports refusing therapy at times. Does not always want to walk with them.  4/13 started on Augmentin over the weekend for ear pain and cheek pain.    CODE STATUS/ADVANCE DIRECTIVES DISCUSSION:   DNR / DNI  Patient's living condition: lives in an assisted living facility [facility]    ALLERGIES: Patient has no known allergies.  Past Medical, Surgical, Family and Social History reviewed and  "updated in EPIC.  Medication list and progress notes reviewed in nursing home electronic health record    REVIEW OF SYSTEMS:   Limited secondary to cognitive impairment but today pt reports no pain    PHYSICAL EXAM:  BP 98/67   Pulse 96   Temp 97.9  F (36.6  C)   Resp 20   Ht 1.676 m (5' 6\")   Wt 58 kg (127 lb 12.8 oz)   SpO2 97%   BMI 20.63 kg/m    GENERAL APPEARANCE:  Alert, in no distress  RESP: Nonlabored respirations  CV:  Edema - no lower leg edema. Has bora stocking on.   M/S:  Gait and station observed in wheelchair.  SKIN:  Inspection of skin and subcutaneous tissue at baseline.  Few areas of bruising on hands.  Incision not observed  PSYCH:  insight and judgement, memory impaired, affect and mood normal    ASSESSMENT/PLAN:  Aftercare following surgery of the musculoskeletal system  S/P ORIF of left hip fracture after a fall.  Admitted to the nursing home for therapy.  Will need to follow-up with Ortho in 2 weeks.  On Lovenox for DVT prevention.   -Weightbearing as tolerated per Ortho  - traMADol (ULTRAM) 50 MG tablet; Take 1 tablet (50 mg) by mouth 2 times daily. May also take 1 tablet (50 mg) 2 times daily as needed for pain.  - Acetaminophen 1,000 mg by mouth three times daily and 1,000 mg by mouth once daily as needed.   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  Patient had delirium while at the hospital.  Resides in a locked [facility] at baseline.  Continues on Aricept and sertraline.    Benign prostatic hyperplasia with lower urinary tract symptoms, symptom details unspecified  Stable. Continue flomax.    Anemia due to blood loss, acute  Recheck PRN if symptomatic. Post op hgb 10.2.     Poor dentition  Jaw pain  Patient complaining of cheek and ear pain over the weekend.  Today on " exam he more so has swelling of his lower jaw on the right side with pain with palpation.  I suspect dental abscess or infection.  He is on Augmentin for 5 days.  For an ear infection.  Will continue the Augmentin for a total of 10 days.  Spoke with daughter on the phone and recommended to follow-up with a dentist as soon as possible.    Orders:  No changes.     Electronically signed by:  Cherry DYE CNP        Sincerely,        Cherry Cuello NP

## 2021-04-14 NOTE — PROGRESS NOTES
[facility]  PRIMARY CARE PROVIDER AND CLINIC:  No primary care provider on file., No primary physician on file.  Chief Complaint   Patient presents with     [facility]     [place] Medical Record Number:  [id number]  Place of Service where encounter took place:  [facility] ([facility]) [[id number]]    Ludwin Glasgow  is a 89 year old  (2/14/1927), admitted to the above facility from  [facility]. Hospital stay 3/18/21 through 3/24/21..  Admitted to this facility for  rehab, medical management and nursing care.    HPI:    HPI information obtained from: facility chart records, facility staff, patient report and Care Everywhere Epic chart review.   Brief Summary of Hospital Course: lost his balance and had a fall resulting in a hip fracutre. Underwent ORIF on 3/19. Had problems with A fib with RVR.   MEDICATION CHANGES: started lovenox for 25 days. Started tramadol.  Diltiazem increased from 120 mg daily to 240 mg daily  RECOMMENDED FOLLOW UP: Orthopedic follow-up with Dr. Hill in 2 weeks  Updates on Status Since Skilled nursing Admission: 3/26 nursing reports that they would like the tramadol scheduled. Orders: Acetaminophen 1,000 mg by mouth three times daily and 1,000 mg by mouth once daily as needed.    Tramadol 50 mg p.o. twice a day at 6 AM and 12 PM and tramadol 50 mg p.o. twice a day as needed for pain.  3/30 nursing reports no concerns, therapy reports refusing therapy at times. Does not always want to walk with them.  4/13 started on Augmentin over the weekend for ear pain and cheek pain.    CODE STATUS/ADVANCE DIRECTIVES DISCUSSION:   DNR / DNI  Patient's living condition: lives in an assisted living facility [facility]    ALLERGIES: Patient has no known allergies.  Past Medical, Surgical, Family and Social History reviewed and updated in EPIC.  Medication list and progress notes reviewed in nursing home electronic health  "record    REVIEW OF SYSTEMS:   Limited secondary to cognitive impairment but today pt reports no pain    PHYSICAL EXAM:  BP 98/67   Pulse 96   Temp 97.9  F (36.6  C)   Resp 20   Ht 1.676 m (5' 6\")   Wt 58 kg (127 lb 12.8 oz)   SpO2 97%   BMI 20.63 kg/m    GENERAL APPEARANCE:  Alert, in no distress  RESP: Nonlabored respirations  CV:  Edema - no lower leg edema. Has bora stocking on.   M/S:  Gait and station observed in wheelchair.  SKIN:  Inspection of skin and subcutaneous tissue at baseline.  Few areas of bruising on hands.  Incision not observed  PSYCH:  insight and judgement, memory impaired, affect and mood normal    ASSESSMENT/PLAN:  Aftercare following surgery of the musculoskeletal system  S/P ORIF of left hip fracture after a fall.  Admitted to the nursing home for therapy.  Will need to follow-up with Ortho in 2 weeks.  On Lovenox for DVT prevention.   -Weightbearing as tolerated per Ortho  - traMADol (ULTRAM) 50 MG tablet; Take 1 tablet (50 mg) by mouth 2 times daily. May also take 1 tablet (50 mg) 2 times daily as needed for pain.  - Acetaminophen 1,000 mg by mouth three times daily and 1,000 mg by mouth once daily as needed.   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  Patient had delirium while at the hospital.  Resides in a locked [facility] at baseline.  Continues on Aricept and sertraline.    Benign prostatic hyperplasia with lower urinary tract symptoms, symptom details unspecified  Stable. Continue flomax.    Anemia due to blood loss, acute  Recheck PRN if symptomatic. Post op hgb 10.2.     Poor dentition  Jaw pain  Patient complaining of cheek and ear pain over the weekend.  Today on exam he more so has swelling of his lower jaw on the right side with pain with palpation.  I " suspect dental abscess or infection.  He is on Augmentin for 5 days.  For an ear infection.  Will continue the Augmentin for a total of 10 days.  Spoke with daughter on the phone and recommended to follow-up with a dentist as soon as possible.    Orders:  No changes.     Electronically signed by:  Cherry DYE CNP